# Patient Record
(demographics unavailable — no encounter records)

---

## 2024-12-03 NOTE — HISTORY OF PRESENT ILLNESS
[de-identified] : 12/3/24: - Pain in the back of the right thigh, extending to the back of the knee from hip - No electrical sensation, numbness, or tingling associated with the pain. -Mild groin pain, more pronounced on the right side. - History of cervical spine fusion two years ago. - Lower back problems, uncertain of progression over the past year. - Underwent physical therapy for the last three months for toe, hip, and neck issues. - Cardiac event led to stent placement, currently on blood thinners, cannot have surgery until June 2024. - Pain relief primarily through stretching; unable to take Advil due to blood thinners. - Previous steroid injection in May 2023 for hip arthritis, with no significant relief.  PMH: CAD, stent, cervical spine fusion Relevant allergies: NKDA AC: Clopidogrel, aspirin Tobacco: Cigars

## 2024-12-03 NOTE — PHYSICAL EXAM
[de-identified] : Right hip:  Mild pain with deep flexion and IR.  Limited internal rotation. Straight leg raise: Negative Grossly normal motor and sensory examination of bilateral lower extremities  Significant pain with resisted knee flexion Tender to palpation throughout thigh [de-identified] : 12/3/24: AP Pelvis and lateral view of the [Right] hip were reviewed and demonstrate osteophyte formation and mild joint space narrowing right hip

## 2024-12-03 NOTE — DISCUSSION/SUMMARY
[de-identified] : Right thigh pain, mild to moderate right hip osteoarthritis  Extensive discussion of the natural history of this issue was had with the patient.  We discussed the treatment options focusing on conservative therapy which includes anti-inflammatories, physical therapy/home exercise, & activity modification.   To further evaluate this injury I would like an MRI as patient has significant pain throughout his thigh.. Patient will return after the MRI is completed.  The patient's current medication management of their orthopedic diagnosis was reviewed today: (1) We discussed a comprehensive treatment plan that included possible pharmaceutical management involving the use of prescription strength medications including but not limited to options such as oral Ibuprofen 400mg QID, once daily Meloxicam 15 mg, or 500-650 mg Tylenol versus over the counter oral medications and topical prescription NSAID Pennsaid vs over the counter Voltaren gel. (2) There is a moderate risk of morbidity with further treatment, especially from use of prescription strength medications and possible side effects of these medications which include upset stomach with oral medications, skin reactions to topical medications and cardiac/renal issues with long term use. (3) I recommended that the patient follow-up with their medical physician to discuss any significant specific potential issues with long term medication use such as interactions with current medications or with exacerbation of underlying medical comorbidities. (4) The benefits and risks associated with use of injectable, oral or topical, prescription and over the counter anti-inflammatory medications were discussed with the patient. The patient voiced understanding of the risks including but not limited to bleeding, stroke, kidney dysfunction, heart disease, and were referred to the black box warning label for further information.

## 2024-12-17 NOTE — ASSESSMENT
[FreeTextEntry1] : Discussed the history, physical examination findings, and recent imaging with the patient with all questions answered.  The patient's exam is more consistent with a carpal tunnel syndrome versus cervical radiculopathy.  He will be referred to the hand specialist for further evaluation which may include EMG/NCV studies.  Discussed that there is no role for MRI imaging at this time.  The patient will follow-up with us as needed.

## 2024-12-17 NOTE — PHYSICAL EXAM
[General Appearance - Alert] : alert [General Appearance - In No Acute Distress] : in no acute distress [General Appearance - Well Nourished] : well nourished [General Appearance - Well Developed] : well developed [General Appearance - Well-Appearing] : healthy appearing [] : normal voice and communication [Well-Healed] : well-healed [Oriented To Time, Place, And Person] : oriented to person, place, and time [Impaired Insight] : insight and judgment were intact [Affect] : the affect was normal [Mood] : the mood was normal [Memory Recent] : recent memory was not impaired [Memory Remote] : remote memory was not impaired [Person] : oriented to person [Place] : oriented to place [Time] : oriented to time [Motor Tone] : muscle tone was normal in all four extremities [Motor Strength] : muscle strength was normal in all four extremities [Involuntary Movements] : no involuntary movements were seen [No Muscle Atrophy] : normal bulk in all four extremities [Abnormal Walk] : normal gait [FreeTextEntry1] : Positive Phalen's bilaterally

## 2024-12-17 NOTE — END OF VISIT
Writer attempted to reach Dwayne - patient's father - permission to speak with Dwayne obtained in patient's chart - call went to [employer], message left requesting return call to Dr. Aguiar's office.     Massielr also sent a message via the patient portal requesting a return call to further discuss patient's symptoms.    [Time Spent: ___ minutes] : I have spent [unfilled] minutes of time on the encounter which excludes teaching and separately reported services.

## 2024-12-17 NOTE — REASON FOR VISIT
[Follow-Up: _____] : a [unfilled] follow-up visit [FreeTextEntry1] : Pt is here with complaints of numbness in both arms.

## 2024-12-17 NOTE — HISTORY OF PRESENT ILLNESS
[FreeTextEntry1] : 58-year-old male presents to the neurosurgery office for bilateral upper extremity numbness evaluation.  The patient is known to the office as he previously underwent ACDF C4-6 on 10/20/2022 for cervical myelopathy.  The patient did well postoperatively and has had no new complaints since then.  He is actively going to physical therapy for his hamstring tendinitis and also doing some cardiac rehab.  Because of these new findings of upper extremity numbness and tingling he wanted to come into our office for evaluation.  He denies any complaints of neck pain and denies trauma or recent injury.

## 2024-12-30 NOTE — HISTORY OF PRESENT ILLNESS
[de-identified] : 12/30/24: Patient here for follow-up right leg pain.  States she is not socks are difficult to put on.  Pain is in the groin and buttock and goes down his leg to the knee at times.  Denies electrical sensations numbness tingling.  Driving causes significant pain.  12/3/24: - Pain in the back of the right thigh, extending to the back of the knee from hip - No electrical sensation, numbness, or tingling associated with the pain. -Mild groin pain, more pronounced on the right side. - History of cervical spine fusion two years ago. - Lower back problems, uncertain of progression over the past year. - Underwent physical therapy for the last three months for toe, hip, and neck issues. - Cardiac event led to stent placement, currently on blood thinners, cannot have surgery until June 2024. - Pain relief primarily through stretching; unable to take Advil due to blood thinners. - Previous steroid injection in May 2023 for hip arthritis, with no significant relief.  PMH: CAD, stent, cervical spine fusion Relevant allergies: NKDA AC: Clopidogrel, aspirin Tobacco: Cigars

## 2024-12-30 NOTE — PHYSICAL EXAM
[de-identified] : Right lower extremity:  pain with deep flexion and IR.  Limited internal rotation. Straight leg raise: Negative Grossly normal motor and sensory examination of bilateral lower extremities Negative Fidel, negative Alexandra Significant pain with resisted knee flexion Tender to palpation throughout thigh [de-identified] : 12/30/24: MRI right thigh-mild hamstring tendinosis, mild right hip OA 12/3/24: AP Pelvis and lateral view of the right hip were reviewed and demonstrate osteophyte formation and mild joint space narrowing right hip

## 2025-01-21 NOTE — DISCUSSION/SUMMARY
[de-identified] : Right thigh pain, mild to moderate right hip osteoarthritis, hamstring tendinitis  Extensive discussion of the natural history of this issue was had with the patient.  We discussed the treatment options focusing on conservative therapy which includes anti-inflammatories, physical therapy/home exercise, & activity modification. Recommend physical therapy. Patient will follow-up as needed.  This patient is being managed for a complex chronic issue that requires ongoing medical management. The nature of this condition requires a longitudinal relationship and monitoring over time for appropriate treatment.

## 2025-01-21 NOTE — HISTORY OF PRESENT ILLNESS
[de-identified] : 1/21/25: Patient here for follow-up right leg pain.  States the injection did provide good relief.  Feels it is still working.  Does still get discomfort after sitting in car for more than 20 minutes.  Pain is most in the posterior aspect of his thigh and knee.  12/30/24: Patient here for follow-up right leg pain.  States shoes and socks are difficult to put on.  Pain is in the groin and buttock and goes down his leg to the knee at times.  Denies electrical sensations numbness tingling.  Driving causes significant pain.  12/3/24: - Pain in the back of the right thigh, extending to the back of the knee from hip - No electrical sensation, numbness, or tingling associated with the pain. -Mild groin pain, more pronounced on the right side. - History of cervical spine fusion two years ago. - Lower back problems, uncertain of progression over the past year. - Underwent physical therapy for the last three months for toe, hip, and neck issues. - Cardiac event led to stent placement, currently on blood thinners, cannot have surgery until June 2024. - Pain relief primarily through stretching; unable to take Advil due to blood thinners. - Previous steroid injection in May 2023 for hip arthritis, with no significant relief.  PMH: CAD, stent, cervical spine fusion Relevant allergies: NKDA AC: Clopidogrel, aspirin Tobacco: Cigars

## 2025-01-21 NOTE — PHYSICAL EXAM
[de-identified] : Right lower extremity:  pain with deep flexion and IR.  Limited internal rotation. Straight leg raise: Negative Grossly normal motor and sensory examination of bilateral lower extremities Negative Fidel, negative Alexandra Significant pain with resisted knee flexion Tender to palpation throughout thigh, improved [de-identified] : 12/30/24: MRI right thigh-mild hamstring tendinosis, mild right hip OA 12/3/24: AP Pelvis and lateral view of the right hip were reviewed and demonstrate osteophyte formation and mild joint space narrowing right hip

## 2025-04-07 NOTE — DISCUSSION/SUMMARY
[de-identified] : Right thigh pain, mild to moderate right hip osteoarthritis, hamstring tendinitis, acute exacerbation after fall at work.  Left knee pain.  Extensive discussion of the natural history of this issue was had with the patient.  We discussed the treatment options focusing on conservative therapy which includes anti-inflammatories, physical therapy/home exercise, & activity modification.  -A prescription for a Medrol Dosepak with the appropriate warnings and side effects was given to the patient. -Physical therapy prescription was given to the patient. We discussed red flag symptoms and that the patient should immediately report to the emergency department if these occur.  Patient is temporarily 100% disabled.  Note given to return to work in 1 week.  Discussed he should follow-up with sports medicine for his shoulder as well as spine for his back.

## 2025-04-07 NOTE — PHYSICAL EXAM
[de-identified] : Extensive ecchymosis around left lateral flank Right lower extremity:  pain with deep flexion and IR.  Limited internal rotation. Straight leg raise: Negative Grossly normal motor and sensory examination of bilateral lower extremities Positive Fidel, negative Alexandra pain with resisted knee flexion Tender palpation over right greater troch  Left lower extremity Hip: Normal ROM without pain on IR/ER Knee Inspection: no effusion Wounds: none Alignment: Neutral Palpation: No specific tenderness on palpation. ROM active (in degrees): 0-140 without crepitus or pain through the arc of motion Ligamentous laxity: stable to varus stress test, stable to valgus stress test Meniscal Test: negative McMurrays, negative Alexandra Muscle Test: good quad strength [de-identified] : 4/7/25: Right knee xrays, standing AP/Lateral and Merchant films, and 45 degree PA standing view taken today in the office are reviewed and demonstrate preserved joint space, no abnormalities AP Pelvis and lateral view of the right hip were reviewed and demonstrate osteophyte formation and mild joint space narrowing right hip  12/30/24: MRI right thigh-mild hamstring tendinosis, mild right hip OA 12/3/24: AP Pelvis and lateral view of the right hip were reviewed and demonstrate osteophyte formation and mild joint space narrowing right hip

## 2025-04-07 NOTE — HISTORY OF PRESENT ILLNESS
[de-identified] : 4/7/25: Patient here for new complaint of fall down steps Sunday before last at work.  He landed on his left side and has significant bruising there.  He went to urgent care where they checked his kidneys.  Has pain on the left low back lateral aspect of the left flank, and left shoulder.  His right hip and leg pain seems significant much worse.  Pain is mostly lateral aspect of the right hip.  Does also have right knee pain in the posterior aspect.  Had a steroid injection in January to provide relief until the fall.  Has significant difficulty putting shoes and socks on.  Feels very limited right now, does not feel he is able to work.  1/21/25: Patient here for follow-up right leg pain.  States the injection did provide good relief.  Feels it is still working.  Does still get discomfort after sitting in car for more than 20 minutes.  Pain is most in the posterior aspect of his thigh and knee.  12/30/24: Patient here for follow-up right leg pain.  States shoes and socks are difficult to put on.  Pain is in the groin and buttock and goes down his leg to the knee at times.  Denies electrical sensations numbness tingling.  Driving causes significant pain.  12/3/24: - Pain in the back of the right thigh, extending to the back of the knee from hip - No electrical sensation, numbness, or tingling associated with the pain. -Mild groin pain, more pronounced on the right side. - History of cervical spine fusion two years ago. - Lower back problems, uncertain of progression over the past year. - Underwent physical therapy for the last three months for toe, hip, and neck issues. - Cardiac event led to stent placement, currently on blood thinners, cannot have surgery until June 2024. - Pain relief primarily through stretching; unable to take Advil due to blood thinners. - Previous steroid injection in May 2023 for hip arthritis, with no significant relief.  PMH: CAD, stent, cervical spine fusion Relevant allergies: NKDA AC: Clopidogrel, aspirin Tobacco: Cigars

## 2025-04-07 NOTE — END OF VISIT
[FreeTextEntry3] : My cumulative time spent on this patient's visit included: Preparation for the visit, review of the medical records, review of pertinent diagnostic studies, examination and counseling of the patient on the above diagnosis, treatment plan and prognosis, orders of diagnostic tests, medications and/or appropriate procedures and documentation in the medical records of today's visit. Time spent on separately reportable procedures is excluded. [Time Spent: ___ minutes] : I have spent [unfilled] minutes of time on the encounter which excludes teaching and separately reported services.

## 2025-04-09 NOTE — PHYSICAL EXAM
[de-identified] : CONSTITUTIONAL: Patient is a very pleasant individual who is well-nourished and appears stated age. PSYCHIATRIC: Alert and oriented times three and in no apparent distress, and participates with orthopedic evaluation well. HEAD: Atraumatic and nonsyndromic in appearance. EENT: No thyromegaly, EOMI. RESPIRATORY: Respiratory rate is regular, not dyspneic on examination. LYMPHATICS: There is no cervical or axillary lymphadenopathy. INTEGUMENTARY: Skin is clean, dry, and intact to bilateral lower extremities. VASCULAR: There is brisk capillary refill about the bilateral Lower Extremities with 2+ DP Pulse  Palpation: There is significant ecchymosis and bruising to the left lateral flank tenderness to palpation Tenderness along the left paraspinal musculature lower lumbar spine No pain with internal/external range of motion of left hip  Muscle Strength Testing: Hip Flexion: 5/5 B/L Knee Extension: 5/5 B/L Knee Flexion: 5/5 B/L Dorsiflexion: 5/5 B/L EHL: 5/5 B/L Plantarflexion: 5/5 B/L  Sensation: SILT L2-S1 B/L except: none  Reflexes: 2+ Quadriceps/Achilles  Gait: Mild antalgic gait secondary to left-sided low back and left flank pain  Special Testing:  Negative SLR BLLE   [de-identified] : Standing AP, lateral, flexion and extension radiographs of the lumbar spine performed on 4/9/2025 in the Radiology Department at [facility] [place] for the indication of low back pain are reviewed.  These studies demonstrate no significant hip osteoarthritis lateral graphs demonstrates well-maintained lumbar lordosis no significant spondylosis or spondylolisthesis  MRI performed at outside facility in March 2025 visible on his cell phone demonstrates comparable MRI disc bulging L3-4 and L4-5 no signs of worsening disc herniation no signs of acute fractures or dislocations

## 2025-04-09 NOTE — HISTORY OF PRESENT ILLNESS
[de-identified] : CC: Low back pain left-sided  hpi: 58-year-old male presenting today for complaints of left-sided low back pain left flank pain that occurred via a work accident on 3/3/2025.  He is a  he was walking down the steps at work slipped and landed on his left side and was having severe discomfort left side left shoulder left flank left-sided low back.  He was able to be helped up and he was unable to finish his shift that day since then he has not been back to work he still been suffering from significant left-sided low back left flank pain he has significant bruising in that area he states that the pain was severe he went to [facility] where he had updated imaging and was discharged with some anti-inflammatories.  He states that his symptoms have mildly improved but still having significant limitations with getting in and out of a car, standing and lifting, ambulating for an extended period he denies any left lower extremity radicular symptoms denies any right lower extremity radicular symptoms change from his baseline.  Rates pain a 7 out of 10 in severity.  Currently started the lidocaine patches and Medrol Dosepak prescribed by his PCP [FreeTextEntry1] : CC: Low back pain left-sided  hpi: 58-year-old male presenting today for complaints of left-sided low back pain left flank pain that occurred via a work accident on 3/3/2025.  He is a  he was walking down the steps at work slipped and landed on his left side and was having severe discomfort left side left shoulder left flank left-sided low back.  He was able to be helped up and he was unable to finish his shift that day since then he has not been back to work he still been suffering from significant left-sided low back left flank pain he has significant bruising in that area he states that the pain was severe he went to [facility] where he had updated imaging and was discharged with some anti-inflammatories.  He states that his symptoms have mildly improved but still having significant limitations with getting in and out of a car, standing and lifting, ambulating for an extended period he denies any left lower extremity radicular symptoms denies any right lower extremity radicular symptoms change from his baseline.  Rates pain a 7 out of 10 in severity.  Currently started the lidocaine patches and Medrol Dosepak prescribed by his PCP [FreeTextEntry2] :  [FreeTextEntry3] : Difficulty with sitting difficulty getting in and out of a car from a seated position, difficulty lifting 5 to 10 pounds [Has the patient missed work because of the injury/illness?] : The patient has missed work because of the injury/illness. [No] : The patient is currently not working. [FreeTextEntry6] : 3/30/25

## 2025-04-09 NOTE — ASSESSMENT
[FreeTextEntry1] : 58-year-old male with lumbar strain/contusion  I discussed with [facility] MRI does not demonstrate any worsening injury compared to his prior MRI from last year.  His symptoms are isolated to the area of ecchymosis which is related to his contusion lumbar strain I did not believe he is suffering from any lumbar radiculopathy I believe his symptoms should improve with time and conservative measures New prescription provided for physical therapy he has no restrictions with therapy We will give him restrictions for work given that he is unable to sit for an extended period or even short periods difficulty getting up from a seated position and difficulty lifting more than 5 pounds we will keep him out of work for the next 2 weeks and he should be able to return back to work after this. I will see him back in 2 weeks for repeat evaluation [Indicate if, in your opinion, the incident that the patient described was the competent medical cause of this injury/illness.] : The incident that the patient described was the competent medical cause of this injury/illness: Yes [Indicate if the patient's complaints are consistent with his/her history of the injury/illness.] : Indicate if the patient's complaints are consistent with his/her history of the injury/illness: Yes [Yes] : Yes, it is consistent [Can the patient return to usual work activities as indicated? If yes, indicate date___] : The patient cannot return to usual work activities as indicated.

## 2025-04-28 NOTE — PHYSICAL EXAM
[de-identified] : Patient is able to stand and ambulate have difficulty sitting secondary to discomfort No pain with internal/external range of motion of right hip Positive leg raise test right lower extremity 5 out of 5 strength hip flexion dorsiflexion plantarflexion Sensations intact to light touch L2-S1 right lower extremity

## 2025-04-28 NOTE — HISTORY OF PRESENT ILLNESS
[de-identified] : CC; low back pain and new right leg pain  hpi: 58-year-old male presenting today for 2-week follow-up visit.  At that he suffered a fall at work landing on the left side of his back injuring his left low back left flank left shoulder on 3/30/2025.  We tried a course of physical therapy however he was unable to attend given approval.  He said that he would like to do that overall he states that his symptoms have worsened.  He still having consistent persistent low back pain 7 out of 10 in severity worse with bending twisting lifting.  He has difficulty lifting more than 5 or 10 pounds.  Driving is also been very difficult he states sitting he gets some pain and numbness in his low back gluteal region.  He is getting new right leg radicular symptoms he states the radiating down his right leg posterior hamstring down to the popliteal fossa.  This causing him discomfort.

## 2025-04-28 NOTE — ASSESSMENT
[FreeTextEntry1] : 58-year-old male with lumbar strain/contusion, new onset right lower extremity radiculopathy  Bola is having persistent low back pain new right lower extremity radicular symptoms.  He states that the right leg pain came on and is now severe.  However it is episodic worse with sitting worse with bending or lifting I discussed with him that he will attend physical therapy we have approval and he should go today to start, this can improve his symptoms if his symptoms do not improve then we will likely move forward with a lumbar MRI We will give him restrictions for work given that he is unable to sit for an extended period or even short periods difficulty getting up from a seated position and difficulty lifting more than 5 pounds we will keep him out of work for the next 3 weeks and he should be able to return back to work after this. I will see him back in 3 weeks for repeat evaluation.

## 2025-05-19 NOTE — PHYSICAL EXAM
[de-identified] : Still having continued left shoulder complaints with passive range of motion, active range of motion Positive impingement signs left shoulder with abduction external rotation recreating symptoms Right knee tenderness palpation along medial lateral joint line there is pain with Fidel's pain with arc of range of motion and deep flexion to 120 degrees There is pain with straight leg raise mainly along the hamstring origin insertion

## 2025-05-19 NOTE — ASSESSMENT
[FreeTextEntry1] : 58-year-old male with lumbar strain, left shoulder internal derangement, right knee internal derangement and right hamstring tendinitis   Bola's low back symptoms have been improving with physical therapy and recommended we continue physical therapy at this time I would not send him back to work as this would likely set him back and exacerbate his symptoms given the repetitive sitting repetitive lifting I believe over the next 4 weeks his symptoms will dissipate and his back will improve to baseline and would not be an issue he agrees with this and we will continue physical therapy and lifting restrictions for the next 4 weeks For his left shoulder right hamstring and right knee these are his biggest complaint at this time and recommending follow-up with our knee specialist and  Dr. Matute I will see him back in 4 weeks  [Indicate if, in your opinion, the incident that the patient described was the competent medical cause of this injury/illness.] : The incident that the patient described was the competent medical cause of this injury/illness: Yes [Indicate if the patient's complaints are consistent with his/her history of the injury/illness.] : Indicate if the patient's complaints are consistent with his/her history of the injury/illness: Yes [Yes] : Yes, it is consistent [Can the patient return to usual work activities as indicated? If yes, indicate date___] : The patient cannot return to usual work activities as indicated.

## 2025-05-19 NOTE — HISTORY OF PRESENT ILLNESS
[de-identified] : CC: Low back, left shoulder, right knee and right hamstring pain  hpi: 58-year-old male presenting today for his 4-week follow-up visit.  Boal had a fall at work on 3/30/2025 where he slipped out and his right leg slipped extended and landed on his left side since then he has been complaining of left shoulder low back pain, right knee and right hamstring pain.  I have been treating his back pain which we have been performing physical therapy activity restrictions home exercises and anti-inflammatories.  He states that his symptoms for his back have been improving his back is becoming less of a focal point of his complaints he is mainly complaining of his left shoulder right hamstring and right knee.  He had diagnosis of right knee and right hamstring issues in the past he was was seeing an outside surgeon and he feels that this fall has exacerbated his injuries his left shoulder pain is also new.  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  He feels that his symptoms are improving for his low back which she is happy with.

## 2025-05-29 NOTE — HISTORY OF PRESENT ILLNESS
[de-identified] : 5/29/25: Patient for follow-up right type of pain.  States is not electrical in nature.  Does not have any numbness or tingling.  The steroid injection on the right hip in the past provided relief for different pain.  Also having increased right knee pain.  Feels he is stiff all over and his pain.  He is still on anticoagulation but hoping to get off of it at the end of June.  4/7/25: Patient here for new complaint of fall down steps Sunday before last at work.  He landed on his left side and has significant bruising there.  He went to urgent care where they checked his kidneys.  Has pain on the left low back lateral aspect of the left flank, and left shoulder.  His right hip and leg pain seems significant much worse.  Pain is mostly lateral aspect of the right hip.  Does also have right knee pain in the posterior aspect.  Had a steroid injection in January to provide relief until the fall.  Has significant difficulty putting shoes and socks on.  Feels very limited right now, does not feel he is able to work.  1/21/25: Patient here for follow-up right leg pain.  States the injection did provide good relief.  Feels it is still working.  Does still get discomfort after sitting in car for more than 20 minutes.  Pain is most in the posterior aspect of his thigh and knee.  12/30/24: Patient here for follow-up right leg pain.  States shoes and socks are difficult to put on.  Pain is in the groin and buttock and goes down his leg to the knee at times.  Denies electrical sensations numbness tingling.  Driving causes significant pain.  12/3/24: - Pain in the back of the right thigh, extending to the back of the knee from hip - No electrical sensation, numbness, or tingling associated with the pain. -Mild groin pain, more pronounced on the right side. - History of cervical spine fusion two years ago. - Lower back problems, uncertain of progression over the past year. - Underwent physical therapy for the last three months for toe, hip, and neck issues. - Cardiac event led to stent placement, currently on blood thinners, cannot have surgery until June 2024. - Pain relief primarily through stretching; unable to take Advil due to blood thinners. - Previous steroid injection in May 2023 for hip arthritis, with no significant relief.  PMH: CAD, stent, cervical spine fusion Relevant allergies: NKDA AC: Clopidogrel, aspirin Tobacco: Cigars

## 2025-05-29 NOTE — END OF VISIT
[FreeTextEntry3] : My cumulative time spent on this patient's visit included: Preparation for the visit, review of the medical records, review of pertinent diagnostic studies, examination and counseling of the patient on the above diagnosis, treatment plan and prognosis, orders of diagnostic tests, medications and/or appropriate procedures and documentation in the medical records of today's visit. Time spent on separately reportable procedures is excluded.

## 2025-05-29 NOTE — PROCEDURE
[de-identified] : 5/29/25: Right knee Injection - Steroid (Methylprednisolone) The risks, benefits, and alternatives of the injection were reviewed/discussed with the patient today in office and all of their questions were answered. We discussed possible side effects and efficacy of this injection.  The patient consented to the procedure.  Prior to injection, a Time Out was conducted in accordance with [facility] policy and the site and nature of procedure verified with the patient.  Site and side were verified with the patient.  The injection site was prepped with chloroprep.  Cold spray was utilized to numb the skin. Utilizing sterile technique, 1 ml 40 mg methylprednisolone, 4 ml 1% Lidocaine, and 5 mL 0.25% Bupivicaine were injected. A sterile bandage was applied. The patient tolerated the procedure well and there were no complications.

## 2025-05-29 NOTE — PHYSICAL EXAM
[de-identified] : Right lower extremity:  pain with deep flexion and IR.  Limited internal rotation. Straight leg raise: Negative Grossly normal motor and sensory examination of bilateral lower extremities Positive Fidel, positive Alexandra pain with resisted knee flexion Tender palpation over right greater troch as well as over hamstring tendons around knee  Left lower extremity Hip: Normal ROM without pain on IR/ER Knee Inspection: no effusion Wounds: none Alignment: Neutral Palpation: No specific tenderness on palpation. ROM active (in degrees): 0-140 without crepitus or pain through the arc of motion Ligamentous laxity: stable to varus stress test, stable to valgus stress test Meniscal Test: negative McMurrays, negative Alexandra Muscle Test: good quad strength [de-identified] : 4/7/25: Right knee xrays, standing AP/Lateral and Merchant films, and 45 degree PA standing view taken today in the office are reviewed and demonstrate preserved joint space, no abnormalities AP Pelvis and lateral view of the right hip were reviewed and demonstrate osteophyte formation and mild joint space narrowing right hip  12/30/24: MRI right thigh-mild hamstring tendinosis, mild right hip OA 12/3/24: AP Pelvis and lateral view of the right hip were reviewed and demonstrate osteophyte formation and mild joint space narrowing right hip

## 2025-05-29 NOTE — DISCUSSION/SUMMARY
[de-identified] : Right knee internal derangement.  Right thigh pain, mild to moderate right hip osteoarthritis, hamstring tendinitis, acute exacerbation after fall at work.  Left knee pain.  Extensive discussion of the natural history of this issue was had with the patient.  We discussed the treatment options focusing on conservative therapy which includes anti-inflammatories, physical therapy/home exercise, & activity modification.  A prescription for lidocaine patches from the pharmacy. Patient elected for steroid injection today.  Discussed he should pay close attention to how much pain is relieved by the injection. Patient will follow-up as needed.

## 2025-06-23 NOTE — ASSESSMENT
[FreeTextEntry1] : 58-year-old male with lumbar degenerative disc disease lumbar spondylosis lumbar strain  Bola's symptoms have minimally improved since his last visit.  He still getting aching pain in his back he does not feel like he can return back to work at this capacity, I would not send him back to work as this would likely set him back and exacerbate his symptoms given the repetitive sitting repetitive lifting He has a hearing in the next week to discuss loss wages possible settlement for his Worker's Comp. claim And new prescription for physical therapy was provided He would continue his over-the-counter anti-inflammatory medications as needed He also discussed possibly of a lumbar epidural steroid injection which can help with his low back pain if he wished to pursue that he will reach out to me before his next visit For his left shoulder right hamstring and right knee these are his biggest complaint at this time and recommending follow-up with our knee specialist and  Dr. Matute I will see him back in 4 weeks.   [Indicate if, in your opinion, the incident that the patient described was the competent medical cause of this injury/illness.] : The incident that the patient described was the competent medical cause of this injury/illness: Yes [Indicate if the patient's complaints are consistent with his/her history of the injury/illness.] : Indicate if the patient's complaints are consistent with his/her history of the injury/illness: Yes [Yes] : Yes, it is consistent [Can the patient return to usual work activities as indicated? If yes, indicate date___] : The patient cannot return to usual work activities as indicated.

## 2025-06-23 NOTE — PHYSICAL EXAM
[de-identified] : Patient is ambulating without assistance Negative tension signs bilateral lower extremities So having some tenderness paraspinal musculature lower lumbar spine Recreation of symptoms with forward flexion or axial extension difficulty bending forward and picking up object off the ground 5 out of 5 strength bilateral lower extremities

## 2025-06-23 NOTE — HISTORY OF PRESENT ILLNESS
[de-identified] : cc: Low back pain right hip right knee pain  hpi: 58-year-old male today presenting for his 1 month follow-up visit.  Bola has been suffering from a work injury that occurred on 3/30/2025 where he slipped landing on his left side and since that has been complaining of low back pain worsening right hip and right knee pain.  He is has been out of work now because of difficulty with bending twisting or lifting.  He has been performing physical therapy and he has been seeing Dr. Maravilla for his right hip and right knee he recently received a right knee intra-articular injection he states that provided relief only about 3 hours.  There is of his low back he states that he still gets aching pain axial related mechanical related especially with sitting for long periods or any bending twisting lifting more than 15 pounds.  He denies any radicular complaints.  Physical therapy is helping.  Anti-inflammatory medications are providing only short-term relief overall he feels that his symptoms are stagnant and has not improved.  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 [Has the patient missed work because of the injury/illness?] : The patient has missed work because of the injury/illness. [No] : The patient is currently not working.

## 2025-07-07 NOTE — HISTORY OF PRESENT ILLNESS
[de-identified] : 7/7/25: Patient here for follow-up right leg pain.  States steroid injection really did not provide much relief in the knee.  The hip injection did provide relief that he had in January.  He would like to try this again.  Does have some numbness in his back at times.  5/29/25: Patient for follow-up right leg pain.  States is not electrical in nature.  Does not have any numbness or tingling.  The steroid injection on the right hip in the past provided relief for different pain.  Also having increased right knee pain.  Feels he is stiff all over and his pain.  He is still on anticoagulation but hoping to get off of it at the end of June.  4/7/25: Patient here for new complaint of fall down steps Sunday before last at work.  He landed on his left side and has significant bruising there.  He went to urgent care where they checked his kidneys.  Has pain on the left low back lateral aspect of the left flank, and left shoulder.  His right hip and leg pain seems significant much worse.  Pain is mostly lateral aspect of the right hip.  Does also have right knee pain in the posterior aspect.  Had a steroid injection in January to provide relief until the fall.  Has significant difficulty putting shoes and socks on.  Feels very limited right now, does not feel he is able to work.  1/21/25: Patient here for follow-up right leg pain.  States the injection did provide good relief.  Feels it is still working.  Does still get discomfort after sitting in car for more than 20 minutes.  Pain is most in the posterior aspect of his thigh and knee.  12/30/24: Patient here for follow-up right leg pain.  States shoes and socks are difficult to put on.  Pain is in the groin and buttock and goes down his leg to the knee at times.  Denies electrical sensations numbness tingling.  Driving causes significant pain.  12/3/24: - Pain in the back of the right thigh, extending to the back of the knee from hip - No electrical sensation, numbness, or tingling associated with the pain. -Mild groin pain, more pronounced on the right side. - History of cervical spine fusion two years ago. - Lower back problems, uncertain of progression over the past year. - Underwent physical therapy for the last three months for toe, hip, and neck issues. - Cardiac event led to stent placement, currently on blood thinners, cannot have surgery until June 2024. - Pain relief primarily through stretching; unable to take Advil due to blood thinners. - Previous steroid injection in May 2023 for hip arthritis, with no significant relief.  PMH: CAD, stent, cervical spine fusion Relevant allergies: NKDA AC: Clopidogrel, aspirin Tobacco: Cigars

## 2025-07-07 NOTE — DISCUSSION/SUMMARY
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  Right thigh pain, mild to moderate right hip osteoarthritis, hamstring tendinitis, acute exacerbation after fall at work.  Left knee pain.  Extensive discussion of the natural history of this issue was had with the patient.  We discussed the treatment options focusing on conservative therapy which includes anti-inflammatories, physical therapy/home exercise, & activity modification.  MRI right knee reviewed interpreted Patient would like to try another steroid injection of the right hip.  This was ordered under ultrasound guidance. Patient will follow-up as needed.  This patient is being managed for a complex chronic issue that requires ongoing medical management. The nature of this condition requires a longitudinal relationship and monitoring over time for appropriate treatment.

## 2025-07-07 NOTE — PHYSICAL EXAM
[de-identified] : Right lower extremity:  pain with deep flexion and IR.  Limited internal rotation.-Pain is in buttocks Straight leg raise: Negative Grossly normal motor and sensory examination of bilateral lower extremities Positive Fidel, positive Alexandra pain with resisted knee flexion Tender palpation over right greater troch as well as over hamstring tendons around knee  Left lower extremity Hip: Normal ROM without pain on IR/ER Knee Inspection: no effusion Wounds: none Alignment: Neutral Palpation: No specific tenderness on palpation. ROM active (in degrees): 0-140 without crepitus or pain through the arc of motion Ligamentous laxity: stable to varus stress test, stable to valgus stress test Meniscal Test: negative McMurrays, negative Alexandra Muscle Test: good quad strength [de-identified] : 7/7/25: MRI right knee 6/20/2025 reviewed and interpreted-medial femoral condyle and patella chondromalacia 50% thickness, mucoid degeneration of PCL  4/7/25: Right knee xrays, standing AP/Lateral and Merchant films, and 45 degree PA standing view taken today in the office are reviewed and demonstrate preserved joint space, no abnormalities AP Pelvis and lateral view of the right hip were reviewed and demonstrate osteophyte formation and mild joint space narrowing right hip  12/30/24: MRI right thigh-mild hamstring tendinosis, mild right hip OA 12/3/24: AP Pelvis and lateral view of the right hip were reviewed and demonstrate osteophyte formation and mild joint space narrowing right hip